# Patient Record
Sex: FEMALE | Race: BLACK OR AFRICAN AMERICAN | NOT HISPANIC OR LATINO | Employment: FULL TIME | URBAN - METROPOLITAN AREA
[De-identification: names, ages, dates, MRNs, and addresses within clinical notes are randomized per-mention and may not be internally consistent; named-entity substitution may affect disease eponyms.]

---

## 2023-02-21 LAB
CLUE CELLS: NORMAL
NUGENT SCORE: 1
YEAST: NORMAL

## 2023-02-22 LAB
CHLAMYDIA TRACH., AMPLIFIED: NEGATIVE
N. GONORRHEA, AMPLIFIED: NEGATIVE
TRICHOMONAS VAGINALIS: NEGATIVE
URINE CULTURE: NORMAL

## 2023-04-05 ENCOUNTER — APPOINTMENT (OUTPATIENT)
Dept: LAB | Facility: LAB | Age: 23
End: 2023-04-05
Payer: COMMERCIAL

## 2023-04-05 LAB
ABO GROUP (TYPE) IN BLOOD: NORMAL
ANTIBODY SCREEN: NORMAL
ERYTHROCYTE DISTRIBUTION WIDTH (RATIO) BY AUTOMATED COUNT: 13.6 % (ref 11.5–14.5)
ERYTHROCYTE MEAN CORPUSCULAR HEMOGLOBIN CONCENTRATION (G/DL) BY AUTOMATED: 32.6 G/DL (ref 32–36)
ERYTHROCYTE MEAN CORPUSCULAR VOLUME (FL) BY AUTOMATED COUNT: 93 FL (ref 80–100)
ERYTHROCYTES (10*6/UL) IN BLOOD BY AUTOMATED COUNT: 3.91 X10E12/L (ref 4–5.2)
HEMATOCRIT (%) IN BLOOD BY AUTOMATED COUNT: 36.2 % (ref 36–46)
HEMOGLOBIN (G/DL) IN BLOOD: 11.8 G/DL (ref 12–16)
HEPATITIS B VIRUS SURFACE AG PRESENCE IN SERUM: NONREACTIVE
HEPATITIS C VIRUS AB PRESENCE IN SERUM: NONREACTIVE
HIV 1/ 2 AG/AB SCREEN: NONREACTIVE
LEUKOCYTES (10*3/UL) IN BLOOD BY AUTOMATED COUNT: 6.4 X10E9/L (ref 4.4–11.3)
NRBC (PER 100 WBCS) BY AUTOMATED COUNT: 0 /100 WBC (ref 0–0)
PLATELETS (10*3/UL) IN BLOOD AUTOMATED COUNT: 356 X10E9/L (ref 150–450)
REFLEX ADDED, ANEMIA PANEL: ABNORMAL
RH FACTOR: NORMAL
RUBELLA VIRUS IGG AB: NORMAL
SYPHILIS TOTAL AB: NONREACTIVE
THYROTROPIN (MIU/L) IN SER/PLAS BY DETECTION LIMIT <= 0.05 MIU/L: 0.28 MIU/L (ref 0.44–3.98)
THYROXINE (T4) FREE (NG/DL) IN SER/PLAS: 0.93 NG/DL (ref 0.78–1.48)

## 2023-04-06 LAB — URINE CULTURE: ABNORMAL

## 2023-04-07 LAB
HEMOGLOBIN A2: 3 %
HEMOGLOBIN A: 96.6 %
HEMOGLOBIN F: 0.4 %
HEMOGLOBIN IDENTIFICATION INTERPRETATION: NORMAL
PATH REVIEW-HGB IDENTIFICATION: NORMAL

## 2023-04-19 LAB — URINE CULTURE: NORMAL

## 2023-08-08 LAB
ERYTHROCYTE DISTRIBUTION WIDTH (RATIO) BY AUTOMATED COUNT: 14.5 % (ref 11.5–14.5)
ERYTHROCYTE MEAN CORPUSCULAR HEMOGLOBIN CONCENTRATION (G/DL) BY AUTOMATED: 31.3 G/DL (ref 32–36)
ERYTHROCYTE MEAN CORPUSCULAR VOLUME (FL) BY AUTOMATED COUNT: 96 FL (ref 80–100)
ERYTHROCYTES (10*6/UL) IN BLOOD BY AUTOMATED COUNT: 3.64 X10E12/L (ref 4–5.2)
FERRITIN, PREGNANCY: 11 UG/L
FOLATE, SERUM, PREGNANCY: 6.6 NG/ML
GLUCOSE, 1 HR SCREEN, PREG: 145 MG/DL
HEMATOCRIT (%) IN BLOOD BY AUTOMATED COUNT: 34.8 % (ref 36–46)
HEMOGLOBIN (G/DL) IN BLOOD: 10.9 G/DL (ref 12–16)
IRON (UG/DL) IN SER/PLAS IN PREGNANCY: 25 UG/DL
IRON BINDING CAPACITY (UG/DL) IN PREGNANCY: 457 UG/DL
IRON SATURATION (%) IN PREGNANCY: 5 %
LEUKOCYTES (10*3/UL) IN BLOOD BY AUTOMATED COUNT: 8.8 X10E9/L (ref 4.4–11.3)
NRBC (PER 100 WBCS) BY AUTOMATED COUNT: 0 /100 WBC (ref 0–0)
PLATELETS (10*3/UL) IN BLOOD AUTOMATED COUNT: 311 X10E9/L (ref 150–450)
REFLEX ADDED, ANEMIA PANEL: ABNORMAL
THYROTROPIN (MIU/L) IN SER/PLAS BY DETECTION LIMIT <= 0.05 MIU/L: 0.59 MIU/L (ref 0.44–3.98)
THYROXINE (T4) FREE (NG/DL) IN SER/PLAS: 0.76 NG/DL (ref 0.78–1.48)
VITAMIN B12, PREGNANCY: 320 PG/ML

## 2023-08-09 LAB — SYPHILIS TOTAL AB: NONREACTIVE

## 2023-08-21 ENCOUNTER — HOSPITAL ENCOUNTER (OUTPATIENT)
Dept: DATA CONVERSION | Facility: HOSPITAL | Age: 23
End: 2023-08-21
Attending: STUDENT IN AN ORGANIZED HEALTH CARE EDUCATION/TRAINING PROGRAM
Payer: COMMERCIAL

## 2023-08-21 DIAGNOSIS — Z3A.34 34 WEEKS GESTATION OF PREGNANCY (HHS-HCC): ICD-10-CM

## 2023-08-21 DIAGNOSIS — Z34.80 ENCOUNTER FOR SUPERVISION OF OTHER NORMAL PREGNANCY, UNSPECIFIED TRIMESTER (HHS-HCC): ICD-10-CM

## 2023-08-21 DIAGNOSIS — F32.A DEPRESSION, UNSPECIFIED: ICD-10-CM

## 2023-08-21 DIAGNOSIS — N89.8 OTHER SPECIFIED NONINFLAMMATORY DISORDERS OF VAGINA: ICD-10-CM

## 2023-08-21 DIAGNOSIS — O99.013 ANEMIA COMPLICATING PREGNANCY, THIRD TRIMESTER (HHS-HCC): ICD-10-CM

## 2023-08-21 DIAGNOSIS — M41.9 SCOLIOSIS, UNSPECIFIED: ICD-10-CM

## 2023-08-21 DIAGNOSIS — O99.891 OTHER SPECIFIED DISEASES AND CONDITIONS COMPLICATING PREGNANCY (HHS-HCC): ICD-10-CM

## 2023-08-21 DIAGNOSIS — O99.343 OTHER MENTAL DISORDERS COMPLICATING PREGNANCY, THIRD TRIMESTER (HHS-HCC): ICD-10-CM

## 2023-08-21 DIAGNOSIS — E04.9 NONTOXIC GOITER, UNSPECIFIED: ICD-10-CM

## 2023-08-21 DIAGNOSIS — O99.283 ENDOCRINE, NUTRITIONAL AND METABOLIC DISEASES COMPLICATING PREGNANCY, THIRD TRIMESTER (HHS-HCC): ICD-10-CM

## 2023-08-21 DIAGNOSIS — D64.9 ANEMIA, UNSPECIFIED: ICD-10-CM

## 2023-08-21 LAB
CLUE CELLS WET PREP: NORMAL
TRICH WET PREP: NORMAL
WBC WET PREP: NORMAL /HPF
YEAST PRESENCE WET PREP: NORMAL

## 2023-08-23 ENCOUNTER — LAB (OUTPATIENT)
Dept: LAB | Facility: LAB | Age: 23
End: 2023-08-23
Payer: COMMERCIAL

## 2023-08-23 LAB
GLUCOSE THREE HOUR: NORMAL
GLUCOSE TWO HOUR: NORMAL
GLUCOSE, FASTING: NORMAL
GLUCOSE, ONE HOUR: NORMAL
GTTCM: NORMAL

## 2023-08-30 ENCOUNTER — LAB (OUTPATIENT)
Dept: LAB | Facility: LAB | Age: 23
End: 2023-08-30
Payer: COMMERCIAL

## 2023-08-30 LAB
ERYTHROCYTE DISTRIBUTION WIDTH (RATIO) BY AUTOMATED COUNT: 14.3 % (ref 11.5–14.5)
ERYTHROCYTE MEAN CORPUSCULAR HEMOGLOBIN CONCENTRATION (G/DL) BY AUTOMATED: 31.7 G/DL (ref 32–36)
ERYTHROCYTE MEAN CORPUSCULAR VOLUME (FL) BY AUTOMATED COUNT: 95 FL (ref 80–100)
ERYTHROCYTES (10*6/UL) IN BLOOD BY AUTOMATED COUNT: 3.67 X10E12/L (ref 4–5.2)
GLUCOSE THREE HOUR: 85 MG/DL
GLUCOSE TWO HOUR: 79 MG/DL
GLUCOSE, FASTING: 65 MG/DL
GLUCOSE, ONE HOUR: 90 MG/DL
GTTCM: NORMAL
HEMATOCRIT (%) IN BLOOD BY AUTOMATED COUNT: 34.7 % (ref 36–46)
HEMOGLOBIN (G/DL) IN BLOOD: 11 G/DL (ref 12–16)
LEUKOCYTES (10*3/UL) IN BLOOD BY AUTOMATED COUNT: 8.8 X10E9/L (ref 4.4–11.3)
NRBC (PER 100 WBCS) BY AUTOMATED COUNT: 0 /100 WBC (ref 0–0)
PLATELETS (10*3/UL) IN BLOOD AUTOMATED COUNT: 316 X10E9/L (ref 150–450)
REFLEX ADDED, ANEMIA PANEL: ABNORMAL

## 2023-09-03 ENCOUNTER — HOSPITAL ENCOUNTER (OUTPATIENT)
Dept: DATA CONVERSION | Facility: HOSPITAL | Age: 23
End: 2023-09-03
Attending: OBSTETRICS & GYNECOLOGY
Payer: COMMERCIAL

## 2023-09-03 DIAGNOSIS — Z34.80 ENCOUNTER FOR SUPERVISION OF OTHER NORMAL PREGNANCY, UNSPECIFIED TRIMESTER (HHS-HCC): ICD-10-CM

## 2023-09-03 DIAGNOSIS — Z29.89 ENCOUNTER FOR OTHER SPECIFIED PROPHYLACTIC MEASURES: ICD-10-CM

## 2023-09-14 ENCOUNTER — HOSPITAL ENCOUNTER (OUTPATIENT)
Dept: DATA CONVERSION | Facility: HOSPITAL | Age: 23
End: 2023-09-14
Attending: OBSTETRICS & GYNECOLOGY
Payer: COMMERCIAL

## 2023-09-14 DIAGNOSIS — Z20.822 CONTACT WITH AND (SUSPECTED) EXPOSURE TO COVID-19: ICD-10-CM

## 2023-09-14 DIAGNOSIS — Z3A.37 37 WEEKS GESTATION OF PREGNANCY (HHS-HCC): ICD-10-CM

## 2023-09-14 DIAGNOSIS — O26.893 OTHER SPECIFIED PREGNANCY RELATED CONDITIONS, THIRD TRIMESTER (HHS-HCC): ICD-10-CM

## 2023-09-14 DIAGNOSIS — R05.9 COUGH, UNSPECIFIED: ICD-10-CM

## 2023-09-14 DIAGNOSIS — O47.1 FALSE LABOR AT OR AFTER 37 COMPLETED WEEKS OF GESTATION (HHS-HCC): ICD-10-CM

## 2023-09-14 DIAGNOSIS — R07.9 CHEST PAIN, UNSPECIFIED: ICD-10-CM

## 2023-09-14 DIAGNOSIS — Z34.80 ENCOUNTER FOR SUPERVISION OF OTHER NORMAL PREGNANCY, UNSPECIFIED TRIMESTER (HHS-HCC): ICD-10-CM

## 2023-09-14 LAB — SARS-COV-2 RESULT: NOT DETECTED

## 2023-09-29 VITALS — BODY MASS INDEX: 31.31 KG/M2 | HEIGHT: 64 IN | WEIGHT: 183.42 LBS

## 2023-09-29 VITALS — BODY MASS INDEX: 31.28 KG/M2 | WEIGHT: 183.2 LBS | HEIGHT: 64 IN

## 2023-09-30 NOTE — PROGRESS NOTES
"    Current Stage:   Stage: Triage     Subjective Data:   Antepartum:  Vaginal Bleeding: No   Contractions/Abdominal Pain: No   Discharge/Loss of Fluid: Yes  post shower at 1545   Fetal Movement: Good   Fevers/Chills: No   Preeclampsia Symptoms: No   Antepartum:    23 year old  at 34.1 weeks by 8.4 week ultrasound presents to triage for LOF after a shower at 1545 this afternoon.  She has had no other leakage since then  but \"my towel was wet when I sat on it\" after her shower as well.  Denies contractions and vaginal bleeding.  Endorses good fetal movement.    Pregnancy notable for:  failed 1hr; 3hr pending  anemia in pregnancy  depression; currently in counseling  scoliosis  enlarged thyroid; most recent TSH .59 and T4 free .76 on       Objective Information:    Objective Information:      T   P  R  BP   MAP  SpO2   Value  36.4  91  16  112/71   86  100%  Date/Time  16:20  16:21  16:20  16:20   16:20  16:21  Range  (36.4C - 36.4C )  (85 - 91 )  (16 - 16 )  (112 - 112 )/ (71 - 71 )  (86 - 86 )  (94% - 100% )      Pain reported at  16:20: 0 = None      Physical Exam:   Constitutional: alert, oriented   Obstetric: Cervical Exam: /-3  FHR     Baseline: 135     Variability: mod     Accels: +     Decels: -  TOCO: no contractions noted on NST    SSE: physiologic discharge as well as what appears to be yeast noted along vaginal walls and cervix.  Negative pooling, negative nitrazine, negative ferning.  Specimen sent for wet prep.   Respiratory/Thorax: normal respiratory effort   Extremities: no calf tenderness, reflexes 2+   Psychological: appropriate affect   Skin: no rashes or lesions     Assessment and Plan:   Assessment:    A:  23 year old  at 34.1 weeks  rule out rupture  failed 1hr; 3hr pending  anemia in pregnancy  depression; currently in counseling  scoliosis  enlarged thyroid; most recent TSH .59 and T4 free .76 on   reactive NST  GBS unknown    P:  patient " stated will complete 3hr GTT tomorrow  SSE: negative pooling, negative nitrazine, negative ferning.  BPP 8/8, ARELY 10.4 performed by Dr. Lr.  Discussed with patient that there is no evidence of PPROM at this time; patient verbalized understanding  precautions provided; patient verbalized understanding  patient to keep appointment on 8/23; patient verbalized understanding  patient d/summer home in stable condition    OLMAN CLAROS CNM    Plan of Care Reviewed With:  Plan of Care Reviewed With: patient       Electronic Signatures:  Ann Beltre (HYACINTH-ARNOLD)  (Signed 21-Aug-2023 18:30)   Authored: Current Stage, Subjective Data, Objective Data,  Assessment and Plan, Note Completion      Last Updated: 21-Aug-2023 18:30 by Ann Beltre (HYACINTH-ARNOLD)

## 2023-10-03 ENCOUNTER — TELEPHONE (OUTPATIENT)
Dept: OBSTETRICS AND GYNECOLOGY | Facility: CLINIC | Age: 23
End: 2023-10-03
Payer: COMMERCIAL

## 2023-10-03 NOTE — TELEPHONE ENCOUNTER
Called pt no answer left voicemail for return call  Also left message for [facility] Office [phone]  Will await return call from pt

## 2023-10-03 NOTE — TELEPHONE ENCOUNTER
contacted pt  name and  verified  Spoke with pt gave lactation numbers for both Manisha Jolly and Rubio  pt verbalized understanding  no further questions or concerns at this time

## 2023-10-03 NOTE — TELEPHONE ENCOUNTER
contacted pt  name and  verified   Made pt aware of message to Loree and number to Lactation  Will call pt back if additional suggestions are made by Loree  pt verbalized understanding  no further questions or concerns at this time

## 2023-11-02 PROBLEM — E86.0 DEHYDRATION: Status: ACTIVE | Noted: 2023-11-02

## 2023-11-02 PROBLEM — J34.2 DEVIATED NASAL SEPTUM: Status: ACTIVE | Noted: 2023-11-02

## 2023-11-02 PROBLEM — F34.1 DYSTHYMIA: Status: ACTIVE | Noted: 2023-11-02

## 2023-11-02 PROBLEM — G47.00 INSOMNIA: Status: ACTIVE | Noted: 2023-11-02

## 2023-11-02 PROBLEM — R00.2 PALPITATIONS: Status: ACTIVE | Noted: 2023-11-02

## 2023-11-02 PROBLEM — F41.9 ANXIETY AND DEPRESSION: Status: ACTIVE | Noted: 2023-11-02

## 2023-11-02 PROBLEM — O99.013 ANEMIA AFFECTING PREGNANCY IN THIRD TRIMESTER (HHS-HCC): Status: ACTIVE | Noted: 2023-11-02

## 2023-11-02 PROBLEM — R42 LIGHTHEADEDNESS: Status: ACTIVE | Noted: 2023-11-02

## 2023-11-02 PROBLEM — O09.93 HIGH-RISK PREGNANCY, THIRD TRIMESTER (HHS-HCC): Status: ACTIVE | Noted: 2023-11-02

## 2023-11-02 PROBLEM — F54 PSYCHOLOGICAL FACTORS AFFECTING MEDICAL CONDITION: Status: ACTIVE | Noted: 2023-11-02

## 2023-11-02 PROBLEM — K21.9 GERD (GASTROESOPHAGEAL REFLUX DISEASE): Status: ACTIVE | Noted: 2023-11-02

## 2023-11-02 PROBLEM — E04.9 ENLARGED THYROID: Status: ACTIVE | Noted: 2023-11-02

## 2023-11-02 PROBLEM — F32.A ANXIETY AND DEPRESSION: Status: ACTIVE | Noted: 2023-11-02

## 2023-11-02 PROBLEM — R61 EXCESSIVE SWEATING: Status: ACTIVE | Noted: 2023-11-02

## 2023-11-02 PROBLEM — H52.223 MYOPIA OF BOTH EYES WITH REGULAR ASTIGMATISM: Status: ACTIVE | Noted: 2023-11-02

## 2023-11-02 PROBLEM — H47.093 ASYMMETRY OF OPTIC NERVE OF BOTH EYES: Status: ACTIVE | Noted: 2023-11-02

## 2023-11-02 PROBLEM — R06.00 DYSPNEA: Status: ACTIVE | Noted: 2023-11-02

## 2023-11-02 PROBLEM — N94.9 VAGINAL DISCOMFORT: Status: ACTIVE | Noted: 2023-11-02

## 2023-11-02 PROBLEM — M99.09 SEGMENTAL AND SOMATIC DYSFUNCTION: Status: ACTIVE | Noted: 2023-11-02

## 2023-11-02 PROBLEM — G43.909 MIGRAINE, UNSPECIFIED, NOT INTRACTABLE, WITHOUT STATUS MIGRAINOSUS: Status: ACTIVE | Noted: 2023-11-02

## 2023-11-02 PROBLEM — H52.13 MYOPIA OF BOTH EYES WITH REGULAR ASTIGMATISM: Status: ACTIVE | Noted: 2023-11-02

## 2023-11-02 PROBLEM — M54.2 CERVICALGIA: Status: ACTIVE | Noted: 2023-11-02

## 2023-11-02 PROBLEM — F32.A DEPRESSIVE DISORDER: Status: ACTIVE | Noted: 2023-11-02

## 2023-11-02 RX ORDER — FERROUS SULFATE 325(65) MG
1 TABLET ORAL
COMMUNITY
Start: 2023-08-09

## 2023-11-02 RX ORDER — ACETAMINOPHEN 325 MG/1
TABLET ORAL
COMMUNITY
Start: 2023-09-16 | End: 2023-11-03 | Stop reason: ALTCHOICE

## 2023-11-02 RX ORDER — HYOSCYAMINE SULFATE 0.38 MG/1
0.38 TABLET, EXTENDED RELEASE ORAL
COMMUNITY
End: 2023-11-03 | Stop reason: ALTCHOICE

## 2023-11-02 RX ORDER — GABAPENTIN 100 MG/1
100 CAPSULE ORAL 3 TIMES DAILY
COMMUNITY
Start: 2013-11-26 | End: 2023-11-03 | Stop reason: ALTCHOICE

## 2023-11-02 RX ORDER — IBUPROFEN 600 MG/1
600 TABLET ORAL EVERY 6 HOURS
COMMUNITY
Start: 2023-09-16 | End: 2023-11-03 | Stop reason: ALTCHOICE

## 2023-11-02 RX ORDER — ASCORBIC ACID 250 MG
250 TABLET ORAL
COMMUNITY
Start: 2013-11-26 | End: 2023-11-03 | Stop reason: ALTCHOICE

## 2023-11-02 RX ORDER — CHOLECALCIFEROL (VITAMIN D3) 125 MCG
9000 CAPSULE ORAL EVERY 6 HOURS PRN
COMMUNITY
Start: 2013-11-26 | End: 2023-11-03 | Stop reason: ALTCHOICE

## 2023-11-02 RX ORDER — CHOLECALCIFEROL (VITAMIN D3) 25 MCG
1000 TABLET ORAL
COMMUNITY
End: 2023-11-03 | Stop reason: ALTCHOICE

## 2023-11-02 RX ORDER — LUBIPROSTONE 8 UG/1
CAPSULE ORAL
COMMUNITY
End: 2023-11-03 | Stop reason: ALTCHOICE

## 2023-11-02 RX ORDER — METOCLOPRAMIDE HYDROCHLORIDE 10 MG/1
TABLET ORAL
COMMUNITY
End: 2023-11-03 | Stop reason: ALTCHOICE

## 2023-11-02 RX ORDER — PYRIDOXINE HCL (VITAMIN B6) 25 MG
1 TABLET ORAL 3 TIMES DAILY
COMMUNITY
Start: 2021-05-10 | End: 2023-11-03 | Stop reason: ALTCHOICE

## 2023-11-02 RX ORDER — OMEPRAZOLE 20 MG/1
20-40 CAPSULE, DELAYED RELEASE ORAL
COMMUNITY
End: 2023-11-03 | Stop reason: ALTCHOICE

## 2023-11-02 RX ORDER — ONDANSETRON 4 MG/1
4 TABLET, ORALLY DISINTEGRATING ORAL EVERY 6 HOURS PRN
COMMUNITY
Start: 2022-07-05 | End: 2023-11-03 | Stop reason: ALTCHOICE

## 2023-11-03 ENCOUNTER — POSTPARTUM VISIT (OUTPATIENT)
Dept: OBSTETRICS AND GYNECOLOGY | Facility: HOSPITAL | Age: 23
End: 2023-11-03
Payer: COMMERCIAL

## 2023-11-03 VITALS — SYSTOLIC BLOOD PRESSURE: 120 MMHG | BODY MASS INDEX: 31.09 KG/M2 | WEIGHT: 181 LBS | DIASTOLIC BLOOD PRESSURE: 73 MMHG

## 2023-11-03 DIAGNOSIS — Z30.09 BIRTH CONTROL COUNSELING: ICD-10-CM

## 2023-11-03 PROBLEM — K21.9 GERD (GASTROESOPHAGEAL REFLUX DISEASE): Status: RESOLVED | Noted: 2023-11-02 | Resolved: 2023-11-03

## 2023-11-03 PROBLEM — F34.1 DYSTHYMIA: Status: RESOLVED | Noted: 2023-11-02 | Resolved: 2023-11-03

## 2023-11-03 PROBLEM — R06.00 DYSPNEA: Status: RESOLVED | Noted: 2023-11-02 | Resolved: 2023-11-03

## 2023-11-03 PROBLEM — H47.093 ASYMMETRY OF OPTIC NERVE OF BOTH EYES: Status: RESOLVED | Noted: 2023-11-02 | Resolved: 2023-11-03

## 2023-11-03 PROBLEM — O99.013 ANEMIA AFFECTING PREGNANCY IN THIRD TRIMESTER (HHS-HCC): Status: RESOLVED | Noted: 2023-11-02 | Resolved: 2023-11-03

## 2023-11-03 PROBLEM — N94.9 VAGINAL DISCOMFORT: Status: RESOLVED | Noted: 2023-11-02 | Resolved: 2023-11-03

## 2023-11-03 PROBLEM — G47.00 INSOMNIA: Status: RESOLVED | Noted: 2023-11-02 | Resolved: 2023-11-03

## 2023-11-03 PROBLEM — E86.0 DEHYDRATION: Status: RESOLVED | Noted: 2023-11-02 | Resolved: 2023-11-03

## 2023-11-03 PROBLEM — F54 PSYCHOLOGICAL FACTORS AFFECTING MEDICAL CONDITION: Status: RESOLVED | Noted: 2023-11-02 | Resolved: 2023-11-03

## 2023-11-03 PROBLEM — J34.2 DEVIATED NASAL SEPTUM: Status: RESOLVED | Noted: 2023-11-02 | Resolved: 2023-11-03

## 2023-11-03 PROBLEM — R61 EXCESSIVE SWEATING: Status: RESOLVED | Noted: 2023-11-02 | Resolved: 2023-11-03

## 2023-11-03 PROBLEM — H52.13 MYOPIA OF BOTH EYES WITH REGULAR ASTIGMATISM: Status: RESOLVED | Noted: 2023-11-02 | Resolved: 2023-11-03

## 2023-11-03 PROBLEM — O09.93 HIGH-RISK PREGNANCY, THIRD TRIMESTER (HHS-HCC): Status: RESOLVED | Noted: 2023-11-02 | Resolved: 2023-11-03

## 2023-11-03 PROBLEM — R42 LIGHTHEADEDNESS: Status: RESOLVED | Noted: 2023-11-02 | Resolved: 2023-11-03

## 2023-11-03 PROBLEM — R00.2 PALPITATIONS: Status: RESOLVED | Noted: 2023-11-02 | Resolved: 2023-11-03

## 2023-11-03 PROBLEM — H52.223 MYOPIA OF BOTH EYES WITH REGULAR ASTIGMATISM: Status: RESOLVED | Noted: 2023-11-02 | Resolved: 2023-11-03

## 2023-11-03 PROCEDURE — 99213 OFFICE O/P EST LOW 20 MIN: CPT | Performed by: ADVANCED PRACTICE MIDWIFE

## 2023-11-03 PROCEDURE — 0503F POSTPARTUM CARE VISIT: CPT | Performed by: ADVANCED PRACTICE MIDWIFE

## 2023-11-03 SDOH — ECONOMIC STABILITY: FOOD INSECURITY: WITHIN THE PAST 12 MONTHS, THE FOOD YOU BOUGHT JUST DIDN'T LAST AND YOU DIDN'T HAVE MONEY TO GET MORE.: NEVER TRUE

## 2023-11-03 SDOH — ECONOMIC STABILITY: FOOD INSECURITY: WITHIN THE PAST 12 MONTHS, YOU WORRIED THAT YOUR FOOD WOULD RUN OUT BEFORE YOU GOT MONEY TO BUY MORE.: NEVER TRUE

## 2023-11-03 ASSESSMENT — EDINBURGH POSTNATAL DEPRESSION SCALE (EPDS)
TOTAL SCORE: 6
I HAVE FELT SAD OR MISERABLE: NO, NOT AT ALL
THINGS HAVE BEEN GETTING ON TOP OF ME: YES, MOST OF THE TIME I HAVEN'T BEEN ABLE TO COPE AT ALL
I HAVE LOOKED FORWARD WITH ENJOYMENT TO THINGS: AS MUCH AS I EVER DID
I HAVE BEEN ABLE TO LAUGH AND SEE THE FUNNY SIDE OF THINGS: AS MUCH AS I ALWAYS COULD
I HAVE FELT SCARED OR PANICKY FOR NO GOOD REASON: NO, NOT AT ALL
I HAVE BEEN SO UNHAPPY THAT I HAVE HAD DIFFICULTY SLEEPING: NOT AT ALL
I HAVE BLAMED MYSELF UNNECESSARILY WHEN THINGS WENT WRONG: NOT VERY OFTEN
I HAVE BEEN ANXIOUS OR WORRIED FOR NO GOOD REASON: YES, SOMETIMES
THE THOUGHT OF HARMING MYSELF HAS OCCURRED TO ME: NEVER
I HAVE BEEN SO UNHAPPY THAT I HAVE BEEN CRYING: NO, NEVER

## 2023-11-03 ASSESSMENT — ENCOUNTER SYMPTOMS
DEPRESSION: 0
LOSS OF SENSATION IN FEET: 0
OCCASIONAL FEELINGS OF UNSTEADINESS: 0

## 2023-11-03 ASSESSMENT — PAIN SCALES - GENERAL: PAINLEVEL: 0-NO PAIN

## 2023-11-03 NOTE — PROGRESS NOTES
Subjective   23 y.o.  presenting for postpartum follow-up   Delivery Date: 09/15  GA at Delivery: 37w5d   Type of Delivery: SVB   Pt states she is here for 6 week PP visit and IUD placement  Concerns: none    Pain: controlled  Lacerations: Intact   Lochia: stoppped 2 weeks ago, started spotting   Feeding Method: She is breast feeding with some supplementation.   Problems urinating or having BM: reports occasional mild dyschezia, but states it is not currently bothersome   Contraceptive Method: Withdrawal  Sexual Intimacy: Yes, pt reports that she resumed intercourse, states that she had unprotected sex on Monday.   Support at home: Yes  Sleeping ok: Yes  Mood:   reports that it has been mildly difficult but that she feels supported at home.     Last pap:  NILM   ASCUS 2019    Physical Exam  Constitutional:       Appearance: Normal appearance.   HENT:      Head: Normocephalic.   Neurological:      Mental Status: She is alert.   Psychiatric:         Mood and Affect: Mood normal.         Behavior: Behavior normal.   Vitals reviewed.       Problem List Items Addressed This Visit       Encounter for postpartum visit - Primary    Current Assessment & Plan       Patient wanted IUD, but had had UPI 4 days ago; Advised pt to return in two weeks for IUD placement and to avoid unprotected sexual intercourse for that time   Pt is due for a pap, pap was not collected today because pt prefers to defer until visit for IUD placement          Other Visit Diagnoses       Birth control counseling                IMPRESSIONS:  -discussed with patient about honoring body and postpartum transition   -encouraged patient to continue to take prenatal vitamins for as long as she is breastfeeding  -reviewed importance of calling with any breast complaints, abnormal bleeding, mood changes, or other concerning symptoms - we have many good treatment options for these issues  -cleared to resume normal activity as desired  -return  to work letter written and sent after visit via [facility]  -[facility] for IUD insertion and pap        VÍCTOR Cross APRN-CNM

## 2023-11-03 NOTE — LETTER
Date: November 3, 2023  RE:  Nishant Nguyễn  :  2000      To Whom It May Concern:    Our patient, Nishant, has been under our care and now may return back to work without restrictions.    Their return to work date is:  2023    If you have questions concerning this patient's immediate care, please feel free to contact our office at [phone].    Sincerely,      VÍCTOR Cross

## 2023-11-03 NOTE — ASSESSMENT & PLAN NOTE
Patient wanted IUD, but had had UPI 4 days ago; Advised pt to return in two weeks for IUD placement and to avoid unprotected sexual intercourse for that time   Pt is due for a pap, pap was not collected today because pt prefers to defer until visit for IUD placement

## 2023-11-06 ENCOUNTER — ALLIED HEALTH (OUTPATIENT)
Dept: INTEGRATIVE MEDICINE | Facility: CLINIC | Age: 23
End: 2023-11-06
Payer: COMMERCIAL

## 2023-11-06 ENCOUNTER — TELEPHONE (OUTPATIENT)
Dept: OBSTETRICS AND GYNECOLOGY | Facility: HOSPITAL | Age: 23
End: 2023-11-06

## 2023-11-06 DIAGNOSIS — M99.05 SEGMENTAL AND SOMATIC DYSFUNCTION OF PELVIC REGION: ICD-10-CM

## 2023-11-06 DIAGNOSIS — M41.9 SCOLIOSIS OF THORACOLUMBAR SPINE, UNSPECIFIED SCOLIOSIS TYPE: ICD-10-CM

## 2023-11-06 DIAGNOSIS — M99.03 SEGMENTAL AND SOMATIC DYSFUNCTION OF LUMBAR REGION: Primary | ICD-10-CM

## 2023-11-06 DIAGNOSIS — M53.3 SACROILIAC DYSFUNCTION: ICD-10-CM

## 2023-11-06 DIAGNOSIS — M99.02 SEGMENTAL AND SOMATIC DYSFUNCTION OF THORACIC REGION: ICD-10-CM

## 2023-11-06 PROCEDURE — 98941 CHIROPRACT MANJ 3-4 REGIONS: CPT | Performed by: CHIROPRACTOR

## 2023-11-06 NOTE — PROGRESS NOTES
Subjective   Patient ID: Nishant Nguyễn is a 23 y.o. female who presents November 6, 2023 for back pain.    VPCY    Today, the patient rates their degree of pain as a 3 out of 10 on the numeric pain rating scale.     HPI : Nishant returns for chiropractic care with main complaint of intermittent back discomfort and right sciatic-like symptoms. States that her daughter was born in mid-September and that everything went well. She is feeling quite good postpartum. Notes intermittent symptoms similar to sciatica stemming from the right gluteal region that occurs after moving from a seated position. Notes tenderness within the glute region. She has used the 'Figure-4' stretch at home and notes mild discomfort but some relief as well. Denies new trauma/incident.      Objective   Physical Exam  Neurological:      General: No focal deficit present.      Mental Status: She is alert and oriented to person, place, and time.      Cranial Nerves: No dysarthria or facial asymmetry.      Sensory: Sensation is intact.      Motor: Motor function is intact.      Coordination: Coordination is intact.      Gait: Gait is intact. Gait normal.         Palpation of the following region(s) revealed:    Thoracic: Thoracic paraspinals right, muscular hypertonicity.  Middle trapezius bilateral, muscular hypertonicity.  Lumbar: Lumbar paraspinals bilateral, muscular hypertonicity.  Quadratus lumborum bilateral, muscular hypertonicity.  Gluteal right, hypertonicity and tenderness.        Segmental Joint(s): Segmental joint dysfunction was assessed with motion palpation and is identified in the following areas:  Thoracic : T4, T5, T8, and T12  Lumbopelvic / Sacral SIJ : L4, L5/S1, and R SIJ      Assessment/Plan   Today's Treatment Included: Chiropractic manipulation to the  Segmental Joint(s) Lumbopelvic/Sacral SIJ : L4, L5/S1, and R SIJ Segmental Joint(s) Thoracic : T4, T5, and T8   Treatment Techniques Used : Activator/Tool assisted  technique, Pelvic drop table technique, and Manual traction    Soft-tissue mobilization was performed in the following areas:   Upper Trapezius bilateral  Middle Trapezius bilateral, Rhomboids bilateral, and Pectoralis bilateral  Lumbar Paraspinal mm. bilateral, Quadratus Lumborum bilateral, and Gluteal mm. Glute. Med. right            F/U as needed    The patient tolerated today's treatment with little or no additional discomfort and was instructed to contact the office for questions or concerns.

## 2023-11-08 NOTE — TELEPHONE ENCOUNTER
Called and informed patient that her return to work letter is on MyChart  Patient verbalized understanding  Encouraged patient to call office with any issues with letter and for any other future questions or concerns  Gisell Guillen RN

## 2023-11-16 ENCOUNTER — ALLIED HEALTH (OUTPATIENT)
Dept: INTEGRATIVE MEDICINE | Facility: CLINIC | Age: 23
End: 2023-11-16
Payer: COMMERCIAL

## 2023-11-16 DIAGNOSIS — M53.3 SACROILIAC DYSFUNCTION: ICD-10-CM

## 2023-11-16 DIAGNOSIS — M99.02 SEGMENTAL AND SOMATIC DYSFUNCTION OF THORACIC REGION: ICD-10-CM

## 2023-11-16 DIAGNOSIS — M79.10 MYALGIA: ICD-10-CM

## 2023-11-16 DIAGNOSIS — M99.05 SEGMENTAL AND SOMATIC DYSFUNCTION OF PELVIC REGION: ICD-10-CM

## 2023-11-16 DIAGNOSIS — M41.9 SCOLIOSIS OF THORACOLUMBAR SPINE, UNSPECIFIED SCOLIOSIS TYPE: ICD-10-CM

## 2023-11-16 DIAGNOSIS — M99.03 SEGMENTAL AND SOMATIC DYSFUNCTION OF LUMBAR REGION: Primary | ICD-10-CM

## 2023-11-16 PROCEDURE — 98941 CHIROPRACT MANJ 3-4 REGIONS: CPT | Performed by: CHIROPRACTOR

## 2023-11-16 PROCEDURE — 97112 NEUROMUSCULAR REEDUCATION: CPT | Performed by: CHIROPRACTOR

## 2023-11-16 NOTE — PROGRESS NOTES
Subjective   Patient ID: Nishant Nguyễn is a 23 y.o. female who presents November 16, 2023 for low back pain with radiating symptoms.    VPCY    Today, the patient rates their degree of pain as a 3 out of 10 on the numeric pain rating scale.     Nishant returns for continued chiropractic care. She was last seen by Dr. Waters and reports that she did notice improvement following her last treatment. Today, she explains that she delivered her daughter in mid-September with no complications but continued to experience intermittent sciatica-like symptoms into the right leg. The patient denies any changes in health since her last encounter and will return as needed.        Objective   Physical Exam  Neurological:      General: No focal deficit present.      Mental Status: She is alert and oriented to person, place, and time.      Cranial Nerves: No dysarthria or facial asymmetry.      Sensory: Sensation is intact.      Motor: Motor function is intact.      Coordination: Coordination is intact.      Gait: Gait is intact.       Palpation of the following region(s) revealed:      Lumbar: Lumbar paraspinals bilateral, muscular hypertonicity.  Quadratus lumborum bilateral, muscular hypertonicity.  Gluteal right, hypertonicity and tenderness.  Piriformis right, hypertonicity and tenderness.  Psoas right, muscular hypertonicity.              Assessment/Plan   Today's Treatment Included: Chiropractic manipulation to the  Segmental Joint(s) Lumbopelvic/Sacral SIJ : L4, L5/S1, R SIJ, and L SIJ Segmental Joint(s) Thoracic : T4, T5, and T8   Treatment Techniques Used : Diversified CMT, Pelvic drop table technique, and Manual traction  Neuromuscular Re-Education (47355): Start time: 1:30 pm End time: 2:00 pm  2 Units  Active release  Integrative Dry Needling (IDN) - Needles in / out:  10.    Soft-tissue mobilization was performed in the following areas:       Lumbar Paraspinal mm. bilateral, Quadratus Lumborum bilateral, Gluteal  mm. Glute. Max.  and Glute. Med. right, Piriformis right, and Psoas right            The patient tolerated today's treatment with little or no additional discomfort and was instructed to contact the office for questions or concerns.

## 2023-12-06 ENCOUNTER — APPOINTMENT (OUTPATIENT)
Dept: OBSTETRICS AND GYNECOLOGY | Facility: HOSPITAL | Age: 23
End: 2023-12-06
Payer: COMMERCIAL

## 2023-12-13 ENCOUNTER — ALLIED HEALTH (OUTPATIENT)
Dept: INTEGRATIVE MEDICINE | Facility: CLINIC | Age: 23
End: 2023-12-13
Payer: COMMERCIAL

## 2023-12-13 DIAGNOSIS — M99.02 SEGMENTAL AND SOMATIC DYSFUNCTION OF THORACIC REGION: ICD-10-CM

## 2023-12-13 DIAGNOSIS — M99.03 SEGMENTAL AND SOMATIC DYSFUNCTION OF LUMBAR REGION: ICD-10-CM

## 2023-12-13 DIAGNOSIS — M79.10 MYALGIA: ICD-10-CM

## 2023-12-13 DIAGNOSIS — M41.9 SCOLIOSIS OF THORACOLUMBAR SPINE, UNSPECIFIED SCOLIOSIS TYPE: ICD-10-CM

## 2023-12-13 DIAGNOSIS — M54.2 CERVICALGIA: ICD-10-CM

## 2023-12-13 DIAGNOSIS — M99.01 SEGMENTAL DYSFUNCTION OF CERVICAL REGION: Primary | ICD-10-CM

## 2023-12-13 PROCEDURE — 97112 NEUROMUSCULAR REEDUCATION: CPT | Performed by: CHIROPRACTOR

## 2023-12-13 PROCEDURE — 98941 CHIROPRACT MANJ 3-4 REGIONS: CPT | Performed by: CHIROPRACTOR

## 2023-12-13 NOTE — PROGRESS NOTES
Subjective   Patient ID: Nishant Nguyễn is a 23 y.o. female who presents December 13, 2023 for back pain.    VPCY    Today, the patient rates their degree of pain as a 5 out of 10 on the numeric pain rating scale.     HPI : Nishant returns for chiropractic care with main complaint of neck and back discomfort. She has been feeling under the weather for the past few days, alternating between feeling hot and cold and notes what feels similar to body aches throughout the neck, upper back and lower back. Denies fever. Reports she has been drinking plenty of water but with no relief. Notes discomfort across the bilateral neck, upper back and into her lower back. No radicular complaints reported. Denies new trauma/incident.      Objective   Physical Exam  Neurological:      General: No focal deficit present.      Mental Status: She is alert and oriented to person, place, and time.      Cranial Nerves: No dysarthria or facial asymmetry.      Sensory: Sensation is intact.      Motor: Motor function is intact.      Coordination: Coordination is intact.      Gait: Gait is intact. Gait normal.         Palpation of the following region(s) revealed:  CS PS, upper/mid trap, levator scap, SCM  Thoracic: Thoracic paraspinals right, muscular hypertonicity.  Middle trapezius bilateral, muscular hypertonicity.  Lumbar: Lumbar paraspinals bilateral, muscular hypertonicity.  Quadratus lumborum bilateral, muscular hypertonicity.  Gluteal right, hypertonicity and tenderness.        Segmental Joint(s): Segmental joint dysfunction was assessed with motion palpation and is identified in the following areas:  Cervical : C5 C7  Thoracic : T1, T4, T5, and T9  Lumbopelvic / Sacral SIJ : L4, L5/S1, and R SIJ      Assessment/Plan   Today's Treatment Included: Chiropractic manipulation to the Segmental Joint(s) Cervical : C5 C7 Segmental Joint(s) Lumbopelvic/Sacral SIJ : L4, L5/S1, and R SIJ Segmental Joint(s) Thoracic : T1, T4, T5, and T9    Treatment Techniques Used : Activator/Tool assisted technique, Pelvic drop table technique, and Manual traction  Neuromuscular Re-Education (69388): Start time: 2:20 pm End time: 2:45 pm  2 Units    Soft-tissue mobilization was performed in the following areas:   Cervical paraspinals, upper/mid trap, SCM, rhomboids, pec major/minor  Upper Trapezius bilateral  Middle Trapezius bilateral, Rhomboids bilateral, and Pectoralis bilateral  Lumbar Paraspinal mm. bilateral, Quadratus Lumborum bilateral, and Gluteal mm. Glute. Med. right      F/U as needed    The patient tolerated today's treatment with little or no additional discomfort and was instructed to contact the office for questions or concerns.

## 2024-01-08 ENCOUNTER — ALLIED HEALTH (OUTPATIENT)
Dept: INTEGRATIVE MEDICINE | Facility: CLINIC | Age: 24
End: 2024-01-08
Payer: COMMERCIAL

## 2024-01-08 DIAGNOSIS — M99.03 SEGMENTAL AND SOMATIC DYSFUNCTION OF LUMBAR REGION: ICD-10-CM

## 2024-01-08 DIAGNOSIS — M79.10 MYALGIA: ICD-10-CM

## 2024-01-08 DIAGNOSIS — M99.02 SEGMENTAL AND SOMATIC DYSFUNCTION OF THORACIC REGION: ICD-10-CM

## 2024-01-08 DIAGNOSIS — M99.01 SEGMENTAL DYSFUNCTION OF CERVICAL REGION: Primary | ICD-10-CM

## 2024-01-08 DIAGNOSIS — M41.9 SCOLIOSIS OF THORACOLUMBAR SPINE, UNSPECIFIED SCOLIOSIS TYPE: ICD-10-CM

## 2024-01-08 DIAGNOSIS — M54.2 CERVICALGIA: ICD-10-CM

## 2024-01-08 PROCEDURE — 97112 NEUROMUSCULAR REEDUCATION: CPT | Performed by: CHIROPRACTOR

## 2024-01-08 PROCEDURE — 98941 CHIROPRACT MANJ 3-4 REGIONS: CPT | Performed by: CHIROPRACTOR

## 2024-01-08 NOTE — PROGRESS NOTES
Subjective   Patient ID: Nishant Nguyễn is a 23 y.o. female who presents January 8, 2024 for low back pain with radiating symptoms.    (1/20) VPCY    Today, the patient rates their degree of pain as a 3 out of 10 on the numeric pain rating scale.     Nishant returns for continued chiropractic care. She states that she has been doing well. She states that she continues to experience low back pain frequently. The patient denies any changes in health since her last encounter and will return as needed.        Objective   Physical Exam  Neurological:      General: No focal deficit present.      Mental Status: She is alert and oriented to person, place, and time.      Cranial Nerves: No dysarthria or facial asymmetry.      Sensory: Sensation is intact.      Motor: Motor function is intact.      Coordination: Coordination is intact.      Gait: Gait is intact.       Palpation of the following region(s) revealed:  Lumbar: Lumbar paraspinals bilateral, muscular hypertonicity.  Quadratus lumborum bilateral, muscular hypertonicity.  Gluteal right, hypertonicity and tenderness.  Piriformis right, hypertonicity and tenderness.  Psoas right, muscular hypertonicity.    Assessment/Plan   Today's Treatment Included: Chiropractic manipulation to the Segmental Joint(s) Cervical : C2 C5 Segmental Joint(s) Lumbopelvic/Sacral SIJ : L2 and L5/S1 Segmental Joint(s) Thoracic : T4, T5, and T6   Treatment Techniques Used : Diversified CMT, Pelvic drop table technique, and Manual traction  Neuromuscular Re-Education ([id number]): Start time: 4:30 pm End time: 4:40 pm  1 Units  Active release  Integrative Dry Needling (IDN) - Needles in / out:  10.    Soft-tissue mobilization was performed in the following areas:   Lumbar Paraspinal mm. bilateral, Quadratus Lumborum bilateral, Gluteal mm. Glute. Max.  and Glute. Med. right, Piriformis right, and Psoas right    The patient tolerated today's treatment with little or no additional discomfort and was  instructed to contact the office for questions or concerns.

## 2024-01-09 ENCOUNTER — APPOINTMENT (OUTPATIENT)
Dept: INTEGRATIVE MEDICINE | Facility: CLINIC | Age: 24
End: 2024-01-09
Payer: COMMERCIAL

## 2024-01-10 ENCOUNTER — APPOINTMENT (OUTPATIENT)
Dept: OBSTETRICS AND GYNECOLOGY | Facility: HOSPITAL | Age: 24
End: 2024-01-10
Payer: COMMERCIAL

## 2024-01-11 ENCOUNTER — APPOINTMENT (OUTPATIENT)
Dept: INTEGRATIVE MEDICINE | Facility: CLINIC | Age: 24
End: 2024-01-11
Payer: COMMERCIAL

## 2024-01-17 ENCOUNTER — APPOINTMENT (OUTPATIENT)
Dept: OBSTETRICS AND GYNECOLOGY | Facility: HOSPITAL | Age: 24
End: 2024-01-17
Payer: COMMERCIAL

## 2024-01-22 ENCOUNTER — HOSPITAL ENCOUNTER (EMERGENCY)
Facility: HOSPITAL | Age: 24
Discharge: HOME | End: 2024-01-22
Attending: EMERGENCY MEDICINE
Payer: COMMERCIAL

## 2024-01-22 VITALS
RESPIRATION RATE: 18 BRPM | BODY MASS INDEX: 31.13 KG/M2 | HEIGHT: 64 IN | HEART RATE: 83 BPM | OXYGEN SATURATION: 99 % | WEIGHT: 182.32 LBS | DIASTOLIC BLOOD PRESSURE: 99 MMHG | SYSTOLIC BLOOD PRESSURE: 138 MMHG | TEMPERATURE: 97.7 F

## 2024-01-22 DIAGNOSIS — M62.838 NECK MUSCLE SPASM: ICD-10-CM
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
## 2024-01-22 PROCEDURE — 99283 EMERGENCY DEPT VISIT LOW MDM: CPT | Performed by: EMERGENCY MEDICINE

## 2024-01-22 RX ORDER — LIDOCAINE 50 MG/G
1 PATCH TOPICAL DAILY
Qty: 10 PATCH | Refills: 0 | Status: SHIPPED | OUTPATIENT
Start: 2024-01-22

## 2024-01-22 RX ORDER — METHOCARBAMOL 500 MG/1
500 TABLET, FILM COATED ORAL 2 TIMES DAILY
Qty: 20 TABLET | Refills: 0 | Status: SHIPPED | OUTPATIENT
Start: 2024-01-22 | End: 2024-02-01

## 2024-01-22 ASSESSMENT — PAIN DESCRIPTION - DESCRIPTORS: DESCRIPTORS: SHARP

## 2024-01-22 ASSESSMENT — PAIN DESCRIPTION - LOCATION: LOCATION: HEAD

## 2024-01-22 ASSESSMENT — COLUMBIA-SUICIDE SEVERITY RATING SCALE - C-SSRS
2. HAVE YOU ACTUALLY HAD ANY THOUGHTS OF KILLING YOURSELF?: NO
1. IN THE PAST MONTH, HAVE YOU WISHED YOU WERE DEAD OR WISHED YOU COULD GO TO SLEEP AND NOT WAKE UP?: NO
6. HAVE YOU EVER DONE ANYTHING, STARTED TO DO ANYTHING, OR PREPARED TO DO ANYTHING TO END YOUR LIFE?: NO

## 2024-01-22 ASSESSMENT — PAIN - FUNCTIONAL ASSESSMENT: PAIN_FUNCTIONAL_ASSESSMENT: 0-10

## 2024-01-22 ASSESSMENT — PAIN DESCRIPTION - ONSET: ONSET: SUDDEN

## 2024-01-22 ASSESSMENT — PAIN DESCRIPTION - PAIN TYPE: TYPE: ACUTE PAIN

## 2024-01-22 ASSESSMENT — PAIN SCALES - GENERAL: PAINLEVEL_OUTOF10: 8

## 2024-01-22 ASSESSMENT — PAIN DESCRIPTION - FREQUENCY: FREQUENCY: INTERMITTENT

## 2024-01-22 ASSESSMENT — PAIN DESCRIPTION - ORIENTATION: ORIENTATION: RIGHT

## 2024-01-22 ASSESSMENT — PAIN DESCRIPTION - PROGRESSION: CLINICAL_PROGRESSION: NOT CHANGED

## 2024-01-22 NOTE — DISCHARGE INSTRUCTIONS
Discussed, use ibuprofen Tylenol for pain relief Robaxin for muscle relaxation and Lidoderm patches on the area of pain.  Your pain should improve in the next few days but if you develop any new symptoms you may come back to the emergency room for repeat evaluation.

## 2024-01-22 NOTE — ED PROVIDER NOTES
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 female with no significant past medical history, states that yesterday while she was at work she had an accidental slip and fall where she fell off the last And fell onto her back against the cement.  Never lost consciousness but only hit her upper back and head against the ground was able to get up and continue working had some soreness that ibuprofen last night and went to bed.  Still having some soreness today described mostly spasm in the right trapezius area as well as a mild posterior headache but not having any nausea vomiting or diarrhea visual changes numbness tingling or weakness in any extremity or any deficit in range of motion any joint in the body.  Only took ibuprofen this morning.  Did not take any other medication for relief.  Attempted to go to an urgent care but was told to go to the emergency room for imaging.  No other complaints.           The patient has no other complaints at this time.               PAST MEDICAL HISTORY    Past Medical History:   Diagnosis Date    Headache, unspecified 03/01/2017    Sinus headache    Obstructive sleep apnea (adult) (pediatric) 05/13/2015    Obstructive apnea    Personal history of other diseases of the female genital tract     History of menorrhagia    Personal history of other diseases of the nervous system and sense organs     History  of sleep apnea    Scoliosis, unspecified 11/20/2014    Scoliosis    Sleep related bruxism 05/13/2015    Bruxism, sleep-related    Strain of muscle, fascia and tendon at neck level, initial encounter 12/02/2016    Neck strain         I have personally reviewed the patient's past medical history in the records.  Mayito Chavez MD    SURGICAL HISTORY    Past Surgical History:   Procedure Laterality Date    CT ABDOMEN PELVIS ANGIOGRAM W AND/OR WO IV CONTRAST  01/28/2013    CT ABDOMEN PELVIS ANGIOGRAM W AND/OR WO IV CONTRAST 1/28/2013 [facility] ANCILLARY LEGACY    CT PELVIS ANGIO W AND WO IV CONTRAST  01/28/2013    CT PELVIS ANGIO W AND WO IV CONTRAST 1/28/2013 [facility] [place] ANCILLARY LEGACY    OTHER SURGICAL HISTORY  02/26/2014    Bypass Graft (Non-Vein) Aortic-mesenteric    TONSILLECTOMY  09/06/2013    Tonsillectomy       I have personally reviewed the patient's past surgical history in the records.  Mayito Chavez MD    CURRENT MEDICATIONS    I have reviewed the patient’s medications.   Please see nursing and pharmacy records for the most up to date list.     [unfilled]    ALLERGIES    Allergies   Allergen Reactions    Dicyclomine Dizziness, Other and Unknown     dizziness    Mirtazapine Shortness of breath and Unknown    Milk Unknown    Lactose Other and Nausea And Vomiting     Severely lactose intolerant according to mother       I have personally reviewed the patient's past history of allergies in the records.  Mayito Chavez MD    FAMILY HISTORY    Family History   Problem Relation Name Age of Onset    ERIK disease Mother      No Known Problems Father      Other (sinus problem) Brother      Diabetes Maternal Grandmother      Diabetes Maternal Grandfather      Stroke Other grandmother     Other (cardiac disorder) Other aunt     Other (cardiac disorder) Other uncle     Hypertension Other FHX         multiple family members    Cancer Other FHX         multiple family members    Stroke Maternal Great-Grandmother         I have personally  "reviewed the patient's family history in the records.  Mayito Chavez MD    SOCIAL HISTORY    Social History     Socioeconomic History    Marital status:      Spouse name: Not on file    Number of children: Not on file    Years of education: Not on file    Highest education level: Not on file   Occupational History    Not on file   Tobacco Use    Smoking status: Never    Smokeless tobacco: Never   Vaping Use    Vaping Use: Never used   Substance and Sexual Activity    Alcohol use: Yes     Comment: socially    Drug use: Never    Sexual activity: Yes     Partners: Male   Other Topics Concern    Not on file   Social History Narrative    Not on file     Social Determinants of Health     Financial Resource Strain: Not on file   Food Insecurity: No Food Insecurity (11/3/2023)    Hunger Vital Sign     Worried About Running Out of Food in the Last Year: Never true     Ran Out of Food in the Last Year: Never true   Transportation Needs: Not on file   Physical Activity: Not on file   Stress: Not on file   Social Connections: Not on file   Intimate Partner Violence: Not on file   Housing Stability: Not on file         I have personally reviewed the patient's social history in the records.  Mayito Chavez MD    REVIEW OF SYSTEMS      14 point ROS was reviewed and negative except as noted above in HPI.      PHYSICAL EXAM    VITAL SIGNS:    BP (!) 138/99 (BP Location: Left arm, Patient Position: Sitting)   Pulse 83   Temp 36.5 °C (97.7 °F)   Resp 18   Ht 1.626 m (5' 4\")   Wt 82.7 kg (182 lb 5.1 oz)   LMP 01/21/2024   SpO2 99%   BMI 31.30 kg/m²    Review EMR for vital signs  Nursing note and vitals reviewed.      Constitutional:  Alert and oriented, well-developed, well-nourished, appears stated age, non-toxic appearing  HENT:  Normocephalic, atraumatic, bilateral external ears normal, oropharynx moist, Nose normal.  Neck: normal range of motion, no tenderness, supple, no stridor.  Mild right trapezius spasm noted  Eyes:  " PERRL, EOMI, conjunctiva normal, no discharge.   Cardiovascular:  Normal heart rate, normal rhythm, no murmurs, no rubs, no gallops.   Respiratory:  Normal breath sounds, no respiratory distress, no wheezing, no chest wall tenderness.   GI:  Bowel sounds normal, soft, no tenderness, no rebound or guarding, no distention, no masses pulsatile or otherwise   (any female  exam was done with female chaperone present):   Deferred  Integument:  Warm, dry, no erythema, no rash, no edema.   Back:  No midline tenderness, no CVA tenderness.   Musculoskeletal:  Intact distal pulses, no tenderness, no cyanosis, no clubbing, with capillary refill less than 2 seconds. Good range of motion in all major joints. No tenderness to palpation or major deformities noted.    Neurologic:  Alert & oriented x 3, normal motor function, normal sensory function, no focal deficits noted. Cranial nerves II-XII intact.  Psychiatric:  Affect normal, judgment normal, mood normal.       EKG  None       Reviewed and interpreted by me, Mayito Chavez MD           RADIOLOGY  No orders to display       All Imaging studies evaluated and interpreted by ED physician except when noted otherwise.    ED PROVIDER INTERPRETATION (XRAYS ONLY):       *I have interpreted the x-ray real-time in the ED myself, and made a clinical decision on it prior to the formal radiology reading.    Mayito Chavez M.D.    RADIOLOGIST IMPRESSION (U/S, CT, MRI):   No orders to display         PERTINENT LABS    Please refer to the chart for all lab work and to MDM for relevant discussion.      PROCEDURE    None (procdoc)    ED COURSE & MEDICAL DECISION MAKING    Pertinent Labs & Imaging studies reviewed. (See chart for details)    MDM:    Assessment: Nishant Nguyễn is a 23 y.o.female who presents to the ED with suspected contusion of the right back and right-sided neck but no midline tenderness noted and no signs of head trauma, trauma occurring about 22 hours ago.  Satisfies  "criteria for not requiring CT head and do not feel that CT cervical or thoracic spine is no needed nor chest x-ray.  Offered patient Robaxin Lidoderm patches to use in addition to ibuprofen Tylenol for which patient agrees with plan.  Otherwise filled out [place] Worker's Comp. paperwork for patient in discharge do not feel that imaging is warranted        Prior records in EPIC reviewed by me.     2023 Coding Requirements:  --Independent historian(s):    see HPI  --Review of prior records:    EHR reviewed   --Relevant comorbidities:    see records  --Social determinants of health:         I have considered the following conditions during my assessment of this patient's   condition: Head injury (subdural, epidural, subarachnoid, facial fractures, corneal   abrasion, ruptured globe), cervical/thoracic/lumbar injury (sprain, fracture,   epidural hematoma, spondylolisthesis, carotid dissection, spinal cord injury,   central cord syndrome), thorax injury (pulmonary contusion, pneumothorax,   rib fracture, sternal fracture, cardiac contusion, aortic dissection/rupture),   intra-abdominal injury (solid organ injury, bowel contusion or perforation),   retroperitoneal injury (renal contusion or rupture), genitourinary injury (ureteral   or bladder injury), pelvic injury (fracture, hip dislocation, hematoma), extremity   fracture/dislocation, foreign from broken glass or other debris at the scene,   compartment syndrome, obstetric injury (uterine rupture, abruptio placenta, injury to fetus)  Syncope, arrythmia, CVA/TIA, ACS              ED VITALS  Vitals:    01/22/24 1510   BP: (!) 138/99   BP Location: Left arm   Patient Position: Sitting   Pulse: 83   Resp: 18   Temp: 36.5 °C (97.7 °F)   SpO2: 99%   Weight: 82.7 kg (182 lb 5.1 oz)   Height: 1.626 m (5' 4\")       BP  Min: 138/99  Max: 138/99    As part of the 2022 MIPS reporting requirements, the following measures have been reviewed and documented:    None     1. Fall, initial " encounter    2. Neck muscle spasm        Diagnoses as of 01/22/24 1519   Fall, initial encounter   Neck muscle spasm         DISPOSITION       DISCHARGE.  The patient is discharged back to their place of residence.  Discharge diagnosis, instructions and plan were discussed and understood. At the time of discharge the patient was comfortable and was in no apparent distress. Patient is aware of diagnostic uncertainty and was notified though testing is negative here, there is a very small chance that pathology may be missed.  The patient understands these risks and the patient /family understood to return immediately to the emergency department if the symptoms worsen or if they have any additional concerns.    DISCHARGE MEDICATIONS  New Prescriptions    No medications on file       FOLLOW UP  No follow-up provider specified.    Mayito Chavez    This note was created with the assistance of voice recognition technology.  While attempts were made to ensure accuracy, mis-transcription may be present due to limitations in the software.        Electronically signed by MD Mayito Arriaga MD  01/22/24 1450

## 2024-02-10 NOTE — PROGRESS NOTES
"Nishant Nguyễn is a 23 y.o. here for IUD insertion. Patient last has UPI 3 weeks ago.     GynHx:  Patient's last menstrual period was 2024.     HPV vaccination: Yes x3  Last pap:  WNL   STI testing today: Yes     OB History          2    Para   2    Term   2            AB        Living   2         SAB        IAB        Ectopic        Multiple        Live Births   2                  Past Medical History:   Diagnosis Date    Abnormal Pap smear of cervix     Headache, unspecified 2017    Sinus headache    Hx of chlamydia infection     Obstructive sleep apnea (adult) (pediatric) 2015    Obstructive apnea    Scoliosis, unspecified 2014    Scoliosis       Past Surgical History:   Procedure Laterality Date    CT ABDOMEN PELVIS ANGIOGRAM W AND/OR WO IV CONTRAST  2013    CT ABDOMEN PELVIS ANGIOGRAM W AND/OR WO IV CONTRAST 2013 [facility] ANCILLARY LEGACY    CT PELVIS ANGIO W AND WO IV CONTRAST  2013    CT PELVIS ANGIO W AND WO IV CONTRAST [facility]    OTHER SURGICAL HISTORY  2014    Bypass Graft (Non-Vein) Aortic-mesenteric    TONSILLECTOMY  2013    Tonsillectomy       Objective   /84   Ht 1.626 m (5' 4\")   Wt 83.9 kg (185 lb)   LMP 2024   BMI 31.76 kg/m²     Physical Exam  Constitutional:       Appearance: Normal appearance.   Genitourinary:      Vulva normal.      Genitourinary Comments: WNL cervix and vagina   Pulmonary:      Effort: Pulmonary effort is normal.   Neurological:      Mental Status: She is alert.   Skin:     General: Skin is warm and dry.   Psychiatric:         Mood and Affect: Mood normal.         Behavior: Behavior normal.         Thought Content: Thought content normal.         Judgment: Judgment normal.       IUD Insertion    Date/Time: 2024 2:42 PM    Performed by: VÍCTOR Rob  Authorized by: VÍCTOR Rob    Consent:     Consent obtained:  Written and " verbal    Consent given by:  Patient    Procedure risks and benefits discussed: yes      Patient questions answered: yes      Patient agrees, verbalizes understanding, and wants to proceed: yes      Educational handouts given: yes      Instructions and paperwork completed: yes    Procedure:     Pelvic exam performed: yes      Negative GC/chlamydia test: collected today.      Negative urine pregnancy test: yes      Cervix cleaned and prepped: yes      Speculum placed in vagina: yes      Tenaculum applied to cervix: yes      Uterus sounded: yes      Uterus sound depth (cm):  8    IUD inserted with no complications: yes      IUD type: Liletta.    Strings trimmed: yes    Post-procedure:     Patient tolerated procedure well: yes      Patient will follow up after next period: yes    Lot number: [id number]  Exp 08/2025      Problem List Items Addressed This Visit    None  Visit Diagnoses       IUD (intrauterine device) in place    -  Primary    Relevant Orders    POCT pregnancy, urine manually resulted (Completed)    Screening for malignant neoplasm of cervix        Relevant Orders    THINPREP PAP            VÍCTOR Rob

## 2024-02-14 ENCOUNTER — OFFICE VISIT (OUTPATIENT)
Dept: OBSTETRICS AND GYNECOLOGY | Facility: HOSPITAL | Age: 24
End: 2024-02-14
Payer: COMMERCIAL

## 2024-02-14 VITALS
WEIGHT: 185 LBS | HEIGHT: 64 IN | BODY MASS INDEX: 31.58 KG/M2 | SYSTOLIC BLOOD PRESSURE: 137 MMHG | DIASTOLIC BLOOD PRESSURE: 84 MMHG

## 2024-02-14 DIAGNOSIS — Z97.5 IUD (INTRAUTERINE DEVICE) IN PLACE: Primary | ICD-10-CM

## 2024-02-14 DIAGNOSIS — Z12.4 SCREENING FOR MALIGNANT NEOPLASM OF CERVIX: ICD-10-CM

## 2024-02-14 PROBLEM — M54.2 CERVICALGIA: Status: RESOLVED | Noted: 2023-11-02 | Resolved: 2024-02-14

## 2024-02-14 LAB — PREGNANCY TEST URINE, POC: NEGATIVE

## 2024-02-14 PROCEDURE — 87800 DETECT AGNT MULT DNA DIREC: CPT | Performed by: ADVANCED PRACTICE MIDWIFE

## 2024-02-14 PROCEDURE — 99213 OFFICE O/P EST LOW 20 MIN: CPT | Performed by: ADVANCED PRACTICE MIDWIFE

## 2024-02-14 PROCEDURE — 88175 CYTOPATH C/V AUTO FLUID REDO: CPT | Mod: TC,GCY | Performed by: ADVANCED PRACTICE MIDWIFE

## 2024-02-14 PROCEDURE — 81025 URINE PREGNANCY TEST: CPT | Performed by: ADVANCED PRACTICE MIDWIFE

## 2024-02-14 PROCEDURE — 58300 INSERT INTRAUTERINE DEVICE: CPT | Performed by: ADVANCED PRACTICE MIDWIFE

## 2024-02-14 PROCEDURE — 87661 TRICHOMONAS VAGINALIS AMPLIF: CPT | Mod: 59 | Performed by: ADVANCED PRACTICE MIDWIFE

## 2024-02-14 PROCEDURE — 2500000004 HC RX 250 GENERAL PHARMACY W/ HCPCS (ALT 636 FOR OP/ED): Performed by: ADVANCED PRACTICE MIDWIFE

## 2024-02-14 PROCEDURE — 1036F TOBACCO NON-USER: CPT | Performed by: ADVANCED PRACTICE MIDWIFE

## 2024-02-14 RX ADMIN — LEVONORGESTREL 52 MG: 52 INTRAUTERINE DEVICE INTRAUTERINE at 01:45

## 2024-02-14 ASSESSMENT — PAIN SCALES - GENERAL: PAINLEVEL: 0-NO PAIN

## 2024-02-15 LAB
C TRACH RRNA SPEC QL NAA+PROBE: NEGATIVE
N GONORRHOEA DNA SPEC QL PROBE+SIG AMP: NEGATIVE
T VAGINALIS RRNA SPEC QL NAA+PROBE: NEGATIVE

## 2024-02-19 ENCOUNTER — APPOINTMENT (OUTPATIENT)
Dept: INTEGRATIVE MEDICINE | Facility: CLINIC | Age: 24
End: 2024-02-19
Payer: COMMERCIAL

## 2024-02-29 LAB
CYTOLOGY CMNT CVX/VAG CYTO-IMP: NORMAL
LAB AP HPV GENOTYPE QUESTION: YES
LAB AP HPV HR: NORMAL
LAB AP PAP ADDITIONAL TESTS: NORMAL
LABORATORY COMMENT REPORT: NORMAL
LMP START DATE: NORMAL
PATH REPORT.TOTAL CANCER: NORMAL

## 2024-05-23 ENCOUNTER — ALLIED HEALTH (OUTPATIENT)
Dept: INTEGRATIVE MEDICINE | Facility: CLINIC | Age: 24
End: 2024-05-23
Payer: COMMERCIAL

## 2024-05-23 DIAGNOSIS — M99.01 SEGMENTAL DYSFUNCTION OF CERVICAL REGION: ICD-10-CM

## 2024-05-23 DIAGNOSIS — M41.9 SCOLIOSIS OF THORACOLUMBAR SPINE, UNSPECIFIED SCOLIOSIS TYPE: ICD-10-CM

## 2024-05-23 DIAGNOSIS — M99.02 SEGMENTAL AND SOMATIC DYSFUNCTION OF THORACIC REGION: Primary | ICD-10-CM

## 2024-05-23 DIAGNOSIS — M99.03 SEGMENTAL AND SOMATIC DYSFUNCTION OF LUMBAR REGION: ICD-10-CM

## 2024-05-23 DIAGNOSIS — M79.10 MYALGIA: ICD-10-CM

## 2024-05-23 DIAGNOSIS — M54.2 CERVICALGIA: ICD-10-CM

## 2024-05-23 PROCEDURE — 97112 NEUROMUSCULAR REEDUCATION: CPT | Performed by: CHIROPRACTOR

## 2024-05-23 PROCEDURE — 98941 CHIROPRACT MANJ 3-4 REGIONS: CPT | Performed by: CHIROPRACTOR
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
## 2024-06-28 ENCOUNTER — APPOINTMENT (OUTPATIENT)
Dept: OBSTETRICS AND GYNECOLOGY | Facility: HOSPITAL | Age: 24
End: 2024-06-28
Payer: COMMERCIAL

## 2024-09-04 NOTE — PROGRESS NOTES
"Nishant Nguyễn is a 24 y.o. female who is here for a annual gyn exam. PCP = none    HPI:   Desires STI testing due to re-entering relationship with  after break-up. Request for hormonal evaluation due to facial hair spreading down neck and up stomach with additional sx fatigue/constipation. Skipped period in May but otherwise currently experiencing monthly cycles. No vaginal itching or burning or pain.    Past med hx and past surg hx reviewed and notable for: IBS, migraines  GynHx:   Periods are usually monthly now but previously irregular, and recently had heavier bleeding that lasted 2 weeks  Sexual concerns:   Current contraception: IUD placed Feb 2024  Seatbelt: yes  STIs: historical chlamydia at age 17, no known exposures recently   Exercise: intermittent, walks in the park  No abuse    Objective   /70 (BP Location: Right arm)   Ht 1.626 m (5' 4\")   Wt 86.1 kg (189 lb 12.8 oz)   LMP  (LMP Unknown) Comment: 2nd week of aug  BMI 32.58 kg/m²   General: Alert and oriented, in no acute distress   External genitalia: Normal architecture without erythema, masses, or lesions.   Urethra: and urethral meatus:  normal   Vagina: mucosa without lesions, masses, or atrophy. No abnormal vaginal discharge.   Cervix: Normal without masses, lesions, or signs of cervicitis. IUD strings NOT visible   Uterus: Normal without masses, lesions, or signs of cervicitis.   Adnexa/parametria: Normal without masses or lesions  Anus and perineum: Normal external inspection of davey-anus and external anus  Psych: Normal affect. Normal mood.     Annual  -Pap 2027  -LNG-IUD placed 2/2024  -STI panel ordered  -Medical hx reviewed and updated    Hirsutism  -PCOS labs and thyroid labs ordered; if all normal will refer to dermatology    Missing IUD strings  -Suspect remains intrauterine however consider TVUS if bleeding remains heavy    Orders Placed This Encounter   Procedures    C. Trachomatis / N. Gonorrhoeae, Amplified " Detection     Standing Status:   Future     Standing Expiration Date:   9/5/2025     Order Specific Question:   Release result to MyChart     Answer:   Immediate    HIV 1/2 Antigen/Antibody Screen with Reflex to Confirmation     Standing Status:   Future     Standing Expiration Date:   9/5/2025     Order Specific Question:   Release result to MyChart     Answer:   Immediate [1]    Hepatitis B Surface Antigen     Standing Status:   Future     Standing Expiration Date:   9/5/2025     Order Specific Question:   Release result to MyChart     Answer:   Immediate [1]    Hepatitis C Antibody     Standing Status:   Future     Standing Expiration Date:   9/5/2025     Order Specific Question:   Release result to MyChart     Answer:   Immediate [1]    Syphilis Screen with Reflex     Standing Status:   Future     Standing Expiration Date:   9/5/2025     Order Specific Question:   Release result to MyChart     Answer:   Immediate [1]    TRICH VAGINALIS, AMPLIFIED     Standing Status:   Future     Standing Expiration Date:   9/5/2025     Order Specific Question:   Release result to MyChart     Answer:   Immediate    Thyroid Stimulating Hormone     Standing Status:   Future     Standing Expiration Date:   9/5/2025     Order Specific Question:   Release result to MyChart     Answer:   Immediate [1]    Thyroxine, Free     Standing Status:   Future     Standing Expiration Date:   9/5/2025     Order Specific Question:   Release result to MyChart     Answer:   Immediate [1]    Testosterone,Free and Total     Standing Status:   Future     Standing Expiration Date:   9/5/2025     Order Specific Question:   Release result to MyChart     Answer:   Immediate [1]    FSH & LH     Standing Status:   Future     Standing Expiration Date:   9/5/2025     Order Specific Question:   Release result to MyChart     Answer:   Immediate [1]    17-Hydroxyprogesterone     Standing Status:   Future     Standing Expiration Date:   9/5/2025     Order Specific  Question:   Release result to MyChart     Answer:   Immediate [1]    Prolactin     Standing Status:   Future     Standing Expiration Date:   9/5/2025     Order Specific Question:   Release result to MyChart     Answer:   Immediate [1]    Hemoglobin A1C     Standing Status:   Future     Standing Expiration Date:   9/5/2025     Order Specific Question:   Release result to MyChart     Answer:   Immediate [1]       Problem List Items Addressed This Visit    None  Visit Diagnoses       Encounter for annual routine gynecological examination    -  Primary    Relevant Orders    Hemoglobin A1C    Screen for STD (sexually transmitted disease)        Relevant Orders    C. Trachomatis / N. Gonorrhoeae, Amplified Detection    HIV 1/2 Antigen/Antibody Screen with Reflex to Confirmation    Hepatitis B Surface Antigen    Hepatitis C Antibody    Syphilis Screen with Reflex    TRICH VAGINALIS, AMPLIFIED    Hirsutism        Relevant Orders    Testosterone,Free and Total    FSH & LH    17-Hydroxyprogesterone    Prolactin    Other fatigue        Relevant Orders    Thyroid Stimulating Hormone    Thyroxine, Free             Heavenly Juarez MD  Female Sexual Medicine Fellow  Dept of Urology/OBGYN    Discussed and evaluated with Dr. Ralph

## 2024-09-05 ENCOUNTER — APPOINTMENT (OUTPATIENT)
Dept: OBSTETRICS AND GYNECOLOGY | Facility: CLINIC | Age: 24
End: 2024-09-05
Payer: COMMERCIAL

## 2024-09-05 VITALS
DIASTOLIC BLOOD PRESSURE: 70 MMHG | WEIGHT: 189.8 LBS | HEIGHT: 64 IN | SYSTOLIC BLOOD PRESSURE: 118 MMHG | BODY MASS INDEX: 32.4 KG/M2

## 2024-09-05 DIAGNOSIS — R53.83 OTHER FATIGUE: ICD-10-CM

## 2024-09-05 DIAGNOSIS — Z01.419 ENCOUNTER FOR ANNUAL ROUTINE GYNECOLOGICAL EXAMINATION: Primary | ICD-10-CM

## 2024-09-05 DIAGNOSIS — L68.0 HIRSUTISM: ICD-10-CM

## 2024-09-05 DIAGNOSIS — Z11.3 SCREEN FOR STD (SEXUALLY TRANSMITTED DISEASE): ICD-10-CM

## 2024-09-05 PROCEDURE — 3008F BODY MASS INDEX DOCD: CPT | Performed by: OBSTETRICS & GYNECOLOGY

## 2024-09-05 PROCEDURE — 87661 TRICHOMONAS VAGINALIS AMPLIF: CPT

## 2024-09-05 PROCEDURE — 87591 N.GONORRHOEAE DNA AMP PROB: CPT

## 2024-09-05 PROCEDURE — 87491 CHLMYD TRACH DNA AMP PROBE: CPT

## 2024-09-05 PROCEDURE — 99395 PREV VISIT EST AGE 18-39: CPT | Performed by: OBSTETRICS & GYNECOLOGY

## 2024-09-05 ASSESSMENT — ENCOUNTER SYMPTOMS
RESPIRATORY NEGATIVE: 0
HEMATOLOGIC/LYMPHATIC NEGATIVE: 0
PSYCHIATRIC NEGATIVE: 0
CONSTITUTIONAL NEGATIVE: 0
GASTROINTESTINAL NEGATIVE: 0
CARDIOVASCULAR NEGATIVE: 0
ALLERGIC/IMMUNOLOGIC NEGATIVE: 0
EYES NEGATIVE: 0
NEUROLOGICAL NEGATIVE: 0
MUSCULOSKELETAL NEGATIVE: 0
ENDOCRINE NEGATIVE: 0

## 2024-09-05 ASSESSMENT — PAIN SCALES - GENERAL: PAINLEVEL: 0-NO PAIN

## 2024-09-05 ASSESSMENT — PATIENT HEALTH QUESTIONNAIRE - PHQ9
1. LITTLE INTEREST OR PLEASURE IN DOING THINGS: NOT AT ALL
SUM OF ALL RESPONSES TO PHQ9 QUESTIONS 1 & 2: 0
2. FEELING DOWN, DEPRESSED OR HOPELESS: NOT AT ALL
